# Patient Record
(demographics unavailable — no encounter records)

---

## 2025-01-08 NOTE — INTERPRETER SERVICES
[Time Spent: ____ minutes] : Total time spent using  services: [unfilled] minutes. The patient's primary language is not English thus required  services. [Interpreters_IDNumber] : [id number] [Interpreters_FullName] : Kristine [TWNoteComboBox1] : [place]

## 2025-01-08 NOTE — HISTORY OF PRESENT ILLNESS
[FreeTextEntry1] : sore throat dry cough [de-identified] : Ms. Mcdermott is 33 years old female with PMHx of osteoporosis (2 years ago), HTN, fell 4 years ago damaging her L wrist s/p surgery is presenting to establish care.  Today she complains of throat pain and dry cough.   On last visit:  She c/o palpitations which has been going on for years; however, for the past few months it's getting worse associated with pressure like chest pain in the midsternal region. It is non radiating. No alleviating or worsening factors. Associated with shortness of breath.  She also complainted of bilateral eyes vision changes ( blurry vision) associated with headaches.   Paternal grandmom - leukemia  materanal granddad and mom - diabetes  maternal uncle - brain cancer  LMP 8/27/24

## 2025-01-08 NOTE — END OF VISIT
[] : Resident [FreeTextEntry3] : I saw the patient, examined the patient independently, reviewed medical record, and provided the medical services. Agree with resident note as personally edited above. supportive care for suspected viral uri otc sudafed and cepacol lozenges were recommended counselled patient on how/when to take medication and about side effects if symptoms were to persist/worsen past 5 days could suggest bacterial infx, for which a course of augmentin would be reasonable has sick contacts but none of sick family tested positive for anything specific- flu or covid, or strep etc... symptoms only present about 2 days  cardio eval pending for intermittent palpitations picked up pvc's last ekg

## 2025-01-08 NOTE — HISTORY OF PRESENT ILLNESS
[FreeTextEntry1] : sore throat dry cough [de-identified] : Ms. Mcdermott is 33 years old female with PMHx of osteoporosis (2 years ago), HTN, fell 4 years ago damaging her L wrist s/p surgery is presenting to establish care.  Today she complains of throat pain and dry cough.   On last visit:  She c/o palpitations which has been going on for years; however, for the past few months it's getting worse associated with pressure like chest pain in the midsternal region. It is non radiating. No alleviating or worsening factors. Associated with shortness of breath.  She also complainted of bilateral eyes vision changes ( blurry vision) associated with headaches.   Paternal grandmom - leukemia  materanal granddad and mom - diabetes  maternal uncle - brain cancer  LMP 8/27/24

## 2025-01-08 NOTE — PHYSICAL EXAM
[No Acute Distress] : no acute distress [Well Nourished] : well nourished [Normal Sclera/Conjunctiva] : normal sclera/conjunctiva [PERRL] : pupils equal round and reactive to light [Normal Outer Ear/Nose] : the outer ears and nose were normal in appearance [Supple] : supple [No Respiratory Distress] : no respiratory distress  [No Accessory Muscle Use] : no accessory muscle use [Clear to Auscultation] : lungs were clear to auscultation bilaterally [Normal Rate] : normal rate  [Regular Rhythm] : with a regular rhythm [Normal S1, S2] : normal S1 and S2 [No Edema] : there was no peripheral edema [Soft] : abdomen soft [Non Tender] : non-tender [Non-distended] : non-distended [No CVA Tenderness] : no CVA  tenderness [No Joint Swelling] : no joint swelling [No Rash] : no rash [No Focal Deficits] : no focal deficits [Normal Affect] : the affect was normal [Normal Insight/Judgement] : insight and judgment were intact [de-identified] : scant pharyngeal erythema  [de-identified] : 2/6 early systolic murmur pulmonic listening post [de-identified] : L wrist surgical scars cdi

## 2025-01-08 NOTE — INTERPRETER SERVICES
[Time Spent: ____ minutes] : Total time spent using  services: [unfilled] minutes. The patient's primary language is not English thus required  services. [Interpreters_IDNumber] : [id number] [Interpreters_FullName] : Kristine [TWNoteComboBox1] : [place]an

## 2025-01-08 NOTE — ASSESSMENT
[FreeTextEntry1] : #Throat pain, generalized body pain and dry cough 2/2 suspected more likely viral flu  - no underlying lung disease - h/o sick contact present - counselling provides for conservative management.  - rest and hydration.  - OTC- couph medicine, acetaminophen or ibuprofen for discomfort and fever.  - Gargle with salt water for throat pain. - Avoid smoking and irritants Recommend supportive care including antipyretics, fluids, OTC cough/cold medications if age-appropriate, and nasal saline followed by nasal suction. Return if symptoms worsen or persist. - if worsening symptoms after 5 days, will provide antibiotic course reports no allergies to medications  #Chest pain  #Palpitations (occasionally) - ekg personally reviewed- pvc, no injury pattern -  ECHO: EF 60 to 65% with mild TR - cardio referral pending eval  #Bilateral blurry vision  - Referral to Opthalmo given   #Osteoporosis  - was on alendronate injections in [place] - low vitamin D - taking calcium and vitamin D supplements - obtain Dexa scan  COUNSELLING: Ensure adequate dietary calcium and Vitamin D intake, or supplement if deficient.  Annual height measurement Weight bearing exercises Avoid smoking Limit alcohol and caffeine Limit PPI use 30 minutes of sunlight exposure on at least 5 days per week Fall Prevention discussed  #HCM  - pending flu vaccine in next visit - routine lab workup  - Opthalmic referral  - gyne referral  - cardio referral - DEXA scan - f/u in 6 months, call nursing line PRN for worsening symtpoms

## 2025-01-08 NOTE — ASSESSMENT
[FreeTextEntry1] : #Throat pain, generalized body pain and dry cough 2/2 suspected more likely viral flu  - no underlying lung disease - h/o sick contact present - counselling provides for conservative management.  - rest and hydration.  - OTC- couph medicine, acetaminophen or ibuprofen for discomfort and fever.  - Gargle with salt water for throat pain. - Avoid smoking and irritants Recommend supportive care including antipyretics, fluids, OTC cough/cold medications if age-appropriate, and nasal saline followed by nasal suction. Return if symptoms worsen or persist. - if worsening symptoms after 5 days, will provide antibiotic course reports no allergies to medications  #Chest pain  #Palpitations (occasionally) - ekg personally reviewed- pvc, no injury pattern -  ECHO: EF 60 to 65% with mild TR - cardio referral pending eval  #Bilateral blurry vision  - Referral to Opthalmo given   #Osteoporosis  - was on alendronate injections in [place] - low vitamin D - taking calcium and vitamin D supplements - obtain Dexa scan  COUNSELLING: Ensure adequate dietary calcium and Vitamin D intake, or supplement if deficient.  Annual height measurement Weight bearing exercises Avoid smoking Limit alcohol and caffeine Limit PPI use 30 minutes of sunlight exposure on at least 5 days per week Fall Prevention discussed  #HCM  - pending flu vaccine in next visit - routine lab workup  - Opthalmic referral  - gyne referral  - cardio referral - DEXA scan - f/u in 6 months, call nursing line PRN for worsening symtpoms

## 2025-01-08 NOTE — REVIEW OF SYSTEMS
[Chest Pain] : chest pain [Palpitations] : palpitations [Shortness Of Breath] : shortness of breath [Headache] : headache [Negative] : Psychiatric [Wheezing] : no wheezing [Cough] : no cough [FreeTextEntry5] : 2 months chest pain

## 2025-01-08 NOTE — PHYSICAL EXAM
[No Acute Distress] : no acute distress [Well Nourished] : well nourished [Normal Sclera/Conjunctiva] : normal sclera/conjunctiva [PERRL] : pupils equal round and reactive to light [Normal Outer Ear/Nose] : the outer ears and nose were normal in appearance [Supple] : supple [No Respiratory Distress] : no respiratory distress  [No Accessory Muscle Use] : no accessory muscle use [Clear to Auscultation] : lungs were clear to auscultation bilaterally [Normal Rate] : normal rate  [Regular Rhythm] : with a regular rhythm [Normal S1, S2] : normal S1 and S2 [No Edema] : there was no peripheral edema [Soft] : abdomen soft [Non Tender] : non-tender [Non-distended] : non-distended [No CVA Tenderness] : no CVA  tenderness [No Joint Swelling] : no joint swelling [No Rash] : no rash [No Focal Deficits] : no focal deficits [Normal Affect] : the affect was normal [Normal Insight/Judgement] : insight and judgment were intact [de-identified] : scant pharyngeal erythema  [de-identified] : 2/6 early systolic murmur pulmonic listening post [de-identified] : L wrist surgical scars cdi

## 2025-03-24 NOTE — PLAN
[FreeTextEntry1] : [place]yn [place] looking to get pregnant sti testing pap/hpv hormone testing rv for results or prn

## 2025-03-24 NOTE — HISTORY OF PRESENT ILLNESS
[Regular Cycle Intervals] : periods have been regular [FreeTextEntry1] : 2/28/25 [No] : Patient does not have concerns regarding sex [Currently Active] : currently active [Women] : women

## 2025-04-07 NOTE — REASON FOR VISIT
[Follow-Up] : a follow-up evaluation of [Interpreters_IDNumber] : [id number] [TWNoteComboBox1] : [place]an

## 2025-04-07 NOTE — PLAN
[FreeTextEntry1] : Pt desiring to carry her partner's fertilized egg pt referred to [facility] for assistance

## 2025-04-07 NOTE — HISTORY OF PRESENT ILLNESS
[No] : Patient does not have concerns regarding sex [FreeTextEntry1] : 32 yo  here today for f/u of test results Pt completed treated for trich infection

## 2025-06-10 NOTE — HISTORY OF PRESENT ILLNESS
[FreeTextEntry1] : 34 years old female with PMHx of osteoporosis (2 years ago), HTN, fell 4 years ago damaging her L wrist s/p surgery is presenting to [facility] care. Was referred by PCP for CP.   ID: [id number]  Pt reports CP in midsternal region nonradiating for past 15 years, which started worsening last year. Comes at rest and last ~15 minutes at a time.  Unrelated to exercise. Pt reports when she breathes faster the pain gets worse.  Otherwise, no alleviating or worsening factors. Associated with shortness of breath.  Reports occasional dizziness with standing.   Denies Smoking, alcohol 2-3 times a year, denies drug use No FH on Heart conditions or sudden cardiac death.   TTE 10/2024:  Summary:  1. Normal global left ventricular systolic function.  2. Left ventricular ejection fraction, by visual estimation, is 60 to 65%.  3. Normal right ventricular size and function.  4. Mild tricuspid regurgitation.  5. Normal pulmonary artery pressure.

## 2025-06-10 NOTE — REVIEW OF SYSTEMS
[Blurry Vision] : blurred vision [Joint Pain] : joint pain [Dizziness] : dizziness [Negative] : Genitourinary [Under Stress] : not under stress [FreeTextEntry5] : See HPI [FreeTextEntry6] : See HPI

## 2025-06-10 NOTE — ASSESSMENT
[FreeTextEntry1] : 34 years old female with PMHx of osteoporosis (2 years ago), HTN, fell 4 years ago damaging her L wrist s/p surgery referred by PCP for CP.   #Noncardiac chest pain - midsternal region nonradiating for past 15 years, which started worsening last year. Comes at rest and last ~15 minutes at a time. Unrelated to exercise, asspc w/ SOB. Pt reports when she breathes faster the pain gets worse. Otherwise, no alleviating or worsening factors. Associated with shortness of breath. Reports occasional dizziness with standing - noncardiac in nature - Denies Smoking, alcohol 2-3 times a year, denies drug use - No FH on Heart conditions or sudden cardiac death.  - TTE 2024: mild TR, otherwise WNL - f/u stress test   - RTC in 3 months

## 2025-06-10 NOTE — END OF VISIT
[] : Resident [FreeTextEntry3] : 34 year old woman with non cardiac chest pain. EKG, echo personally reviewed and discussed with patient. Will do an exercise stress test for ischemia evaluation. [Time Spent: ___ minutes] : I have spent [unfilled] minutes of time on the encounter which excludes teaching and separately reported services.

## 2025-06-10 NOTE — PHYSICAL EXAM
[Well Developed] : well developed [Well Nourished] : well nourished [No Acute Distress] : no acute distress [Normal Conjunctiva] : normal conjunctiva [Normal S1, S2] : normal S1, S2 [No Murmur] : no murmur [No Gallop] : no gallop [No Rub] : no rub [Clear Lung Fields] : clear lung fields [Good Air Entry] : good air entry [No Respiratory Distress] : no respiratory distress  [Non Tender] : non-tender [Soft] : abdomen soft [Normal Bowel Sounds] : normal bowel sounds [Normal Gait] : normal gait [No Edema] : no edema [No Rash] : no rash [Alert and Oriented] : alert and oriented

## 2025-07-08 NOTE — HISTORY OF PRESENT ILLNESS
[Spouse] : spouse [FreeTextEntry1] : Follow-up [de-identified] : Ms. Mcdermott is 34 years old female with PMHx of osteoporosis (4 years ago) who fell 4 years ago damaging her L wrist s/p surgery and is presenting for follow up.  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 She has not applied anything to it and has resumed washing dishes and wearing gloves which has exacerbated the burning and created erythema on the borders. She denies associated fevers, chills, pus.  She also complains of B/L but especially L wrist pain for which she recently went to  and was referred to a specialist. She couldn't see specialist because she needs a referral from PMD. She also endorses chronic back pain that has been especially worse in the last year. It lasts for 3-4 hours and occurs 3-4 times a week. Nothing makes it worse or better. She takes OTC Motrin for both wrist and back pain with relief. She also requests a referral for psychology because she has been experiencing anxiety/panic episodes about 3-4 times a month that are associated with chest palpitations and shortness of breath. She attributes this to be brought on by challenges of immigrating to this country while not speaking the language one year ago. She also expresses desire to discuss possibly removing varicose veins on B/L legs that are not painful.   Since the last visit, she saw cardiologist regarding chest palpitations who told her she is doing well but will need a stress test that is scheduled for October. She followed with ophthalmology regarding blurry vision and got new glasses that have significantly improved her symptoms. She continues to take her Calcium + Vitamin D for osteoporosis. And she has completed a regimen of antibiotics and her symptoms have alleviated since her Trichomonas diagnosis at her last GYN visit.

## 2025-07-08 NOTE — END OF VISIT
[] : Resident [FreeTextEntry3] : I saw the patient, examined the patient independently, reviewed medical record, and provided the medical services. Agree with resident note as personally edited above. talk tx, supportive care for anxiety, no red flags po abx and silvadene for L hand burn/ no red flags to warrant burn sx eval at this time lumbar strain, no neuro red flags, supportive care, prn lowdose muscle relaxant as last resort, PT

## 2025-07-08 NOTE — PHYSICAL EXAM
[No Acute Distress] : no acute distress [Well Nourished] : well nourished [Well Developed] : well developed [Well-Appearing] : well-appearing [Normal Sclera/Conjunctiva] : normal sclera/conjunctiva [No Respiratory Distress] : no respiratory distress  [No Accessory Muscle Use] : no accessory muscle use [Clear to Auscultation] : lungs were clear to auscultation bilaterally [Normal Rate] : normal rate  [Regular Rhythm] : with a regular rhythm [Normal S1, S2] : normal S1 and S2 [No Edema] : there was no peripheral edema [Soft] : abdomen soft [Non Tender] : non-tender [Non-distended] : non-distended [No Masses] : no abdominal mass palpated [Normal Affect] : the affect was normal [Normal Mood] : the mood was normal [de-identified] : Hypertonicity and pain on palpation of left side of back near the lower border of scapula [de-identified] : burm wound with erythematous edges on right-hand dorsum

## 2025-07-08 NOTE — REVIEW OF SYSTEMS
[Muscle Pain] : muscle pain [Fever] : no fever [Chills] : no chills [Fatigue] : no fatigue [Vision Problems] : no vision problems [Earache] : no earache [Hearing Loss] : no hearing loss [Wheezing] : no wheezing [Cough] : no cough [Abdominal Pain] : no abdominal pain [Nausea] : no nausea [Constipation] : no constipation [Diarrhea] : diarrhea [Vomiting] : no vomiting [Dysuria] : no dysuria [Hematuria] : no hematuria [Suicidal] : not suicidal [FreeTextEntry5] : sometimes CP & palpitations when stressed [FreeTextEntry6] : sometimes SOB when stressed [FreeTextEntry9] : see HPI - B/L wrist pain and back pain

## 2025-07-08 NOTE — ASSESSMENT
[FreeTextEntry1] : #Burn wound on right-hand dorsum - erythematous dry burn wound - prescribed silver cream and Keflex PO. adjacent skin concerning for very early cellulitis. not full circumferential, tissue otherwise pink/ viable appearing. no red flags counselled patient on how/when to take medication and about side effects  #B/L wrist pain - gave referral to orthopedic surgeon; hx L wrist surgery  #Chronic back pain - suspect L latissimus and paraspinals spasm - prescribed muscle relaxant, cyclobenzaprine prn counselled patient on how/when to take medication and about side effects - encouraged stretching and exercises - recommended PT  #Anxiety/Panic attacks - gave referral for psychologist - ordered PRN hydroxyzine  - encouraged increasing physical exercise, deep breathing exercises, daily cardiovascular exercise - gave information to emergency services Anxiety;  Treatments options and potential side effects discussed  Recommended referral or follow up visit with behavior health provider  Follow up with health care provider within 30 days  #Chest pain #Palpitations (occasionally) - ekg personally reviewed- pvc, no injury pattern - ECHO: EF 60 to 65% with mild TR - cardio requested stress test, scheduled for October 2025 - more likely associated with anxiety   #Bilateral blurry vision - resolved -  seen by ophthalmologist, got glasses, and blurry vision resolved  #Osteoporosis - was on alendronate injections in [place] - low vitamin D - taking calcium and vitamin D supplements - last Dexa 2023 in [place], will obtain f/u DEXA in future  COUNSELLING: Discussed continuing to maintain diet rich in calcium and vitamin D Encouraged increasing physical exercise and deep breathing exercises Encouraged more stretching for back pain  #HCM - DEXA scan in the future - f/u in 3 months with new routine labs